# Patient Record
Sex: MALE | Race: WHITE | NOT HISPANIC OR LATINO | URBAN - METROPOLITAN AREA
[De-identification: names, ages, dates, MRNs, and addresses within clinical notes are randomized per-mention and may not be internally consistent; named-entity substitution may affect disease eponyms.]

---
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
## 2022-03-09 ENCOUNTER — IMPORTED ENCOUNTER (OUTPATIENT)
Dept: URBAN - METROPOLITAN AREA CLINIC 31 | Facility: CLINIC | Age: 22
End: 2022-03-09

## 2022-03-09 PROBLEM — H10.33: Noted: 2022-03-09

## 2022-03-09 PROCEDURE — 99213 OFFICE O/P EST LOW 20 MIN: CPT

## 2022-03-09 NOTE — PATIENT DISCUSSION
Non-specific Conjunctivitis OU -- The condition was discussed with patient. Inflammatory reaction. PF BID for a week then qd for a week. Avoid possible inflammatory situations patient may discover.

## 2022-04-02 ASSESSMENT — VISUAL ACUITY
OS_SC: J1+17''
OD_SC: 20/20
OD_CC: 20/20-3
OD_CC: 20/20-1
OD_SC: J217''
OS_CC: 20/20-2
OS_SC: 20/20
OS_CC: 20/25

## 2022-04-02 ASSESSMENT — TONOMETRY
OS_IOP_MMHG: 14
OD_IOP_MMHG: 15
OS_IOP_MMHG: 15
OD_IOP_MMHG: 14